# Patient Record
Sex: MALE | Race: BLACK OR AFRICAN AMERICAN
[De-identification: names, ages, dates, MRNs, and addresses within clinical notes are randomized per-mention and may not be internally consistent; named-entity substitution may affect disease eponyms.]

---

## 2019-06-28 ENCOUNTER — HOSPITAL ENCOUNTER (EMERGENCY)
Dept: HOSPITAL 72 - EMR | Age: 34
LOS: 1 days | Discharge: HOME | End: 2019-06-29
Payer: SELF-PAY

## 2019-06-28 VITALS — DIASTOLIC BLOOD PRESSURE: 70 MMHG | SYSTOLIC BLOOD PRESSURE: 122 MMHG

## 2019-06-28 VITALS — WEIGHT: 160 LBS | BODY MASS INDEX: 25.11 KG/M2 | HEIGHT: 67 IN

## 2019-06-28 VITALS — SYSTOLIC BLOOD PRESSURE: 115 MMHG | DIASTOLIC BLOOD PRESSURE: 76 MMHG

## 2019-06-28 VITALS — DIASTOLIC BLOOD PRESSURE: 70 MMHG | SYSTOLIC BLOOD PRESSURE: 120 MMHG

## 2019-06-28 VITALS — SYSTOLIC BLOOD PRESSURE: 118 MMHG | DIASTOLIC BLOOD PRESSURE: 72 MMHG

## 2019-06-28 VITALS — DIASTOLIC BLOOD PRESSURE: 67 MMHG | SYSTOLIC BLOOD PRESSURE: 115 MMHG

## 2019-06-28 DIAGNOSIS — F29: ICD-10-CM

## 2019-06-28 DIAGNOSIS — F15.10: Primary | ICD-10-CM

## 2019-06-28 LAB
ADD MANUAL DIFF: NO
ALBUMIN SERPL-MCNC: 3.7 G/DL (ref 3.4–5)
ALBUMIN/GLOB SERPL: 1.2 {RATIO} (ref 1–2.7)
ALP SERPL-CCNC: 107 U/L (ref 46–116)
ALT SERPL-CCNC: 33 U/L (ref 12–78)
ANION GAP SERPL CALC-SCNC: 8 MMOL/L (ref 5–15)
AST SERPL-CCNC: 30 U/L (ref 15–37)
BASOPHILS NFR BLD AUTO: 0.8 % (ref 0–2)
BILIRUB SERPL-MCNC: 0.5 MG/DL (ref 0.2–1)
BUN SERPL-MCNC: 17 MG/DL (ref 7–18)
CALCIUM SERPL-MCNC: 8.6 MG/DL (ref 8.5–10.1)
CHLORIDE SERPL-SCNC: 104 MMOL/L (ref 98–107)
CK SERPL-CCNC: 282 U/L (ref 26–308)
CO2 SERPL-SCNC: 28 MMOL/L (ref 21–32)
CREAT SERPL-MCNC: 0.8 MG/DL (ref 0.55–1.3)
EOSINOPHIL NFR BLD AUTO: 3.6 % (ref 0–3)
ERYTHROCYTE [DISTWIDTH] IN BLOOD BY AUTOMATED COUNT: 15 % (ref 11.6–14.8)
GLOBULIN SER-MCNC: 3.2 G/DL
HCT VFR BLD CALC: 34.7 % (ref 42–52)
HGB BLD-MCNC: 11.2 G/DL (ref 14.2–18)
LYMPHOCYTES NFR BLD AUTO: 31 % (ref 20–45)
MCV RBC AUTO: 75 FL (ref 80–99)
MONOCYTES NFR BLD AUTO: 6.9 % (ref 1–10)
NEUTROPHILS NFR BLD AUTO: 57.7 % (ref 45–75)
PLATELET # BLD: 400 K/UL (ref 150–450)
POTASSIUM SERPL-SCNC: 3.5 MMOL/L (ref 3.5–5.1)
RBC # BLD AUTO: 4.65 M/UL (ref 4.7–6.1)
SODIUM SERPL-SCNC: 140 MMOL/L (ref 136–145)
WBC # BLD AUTO: 8.4 K/UL (ref 4.8–10.8)

## 2019-06-28 PROCEDURE — 96374 THER/PROPH/DIAG INJ IV PUSH: CPT

## 2019-06-28 PROCEDURE — 85025 COMPLETE CBC W/AUTO DIFF WBC: CPT

## 2019-06-28 PROCEDURE — 99291 CRITICAL CARE FIRST HOUR: CPT

## 2019-06-28 PROCEDURE — 71045 X-RAY EXAM CHEST 1 VIEW: CPT

## 2019-06-28 PROCEDURE — 80307 DRUG TEST PRSMV CHEM ANLYZR: CPT

## 2019-06-28 PROCEDURE — 80329 ANALGESICS NON-OPIOID 1 OR 2: CPT

## 2019-06-28 PROCEDURE — 80053 COMPREHEN METABOLIC PANEL: CPT

## 2019-06-28 PROCEDURE — 96372 THER/PROPH/DIAG INJ SC/IM: CPT

## 2019-06-28 PROCEDURE — 96361 HYDRATE IV INFUSION ADD-ON: CPT

## 2019-06-28 PROCEDURE — 82550 ASSAY OF CK (CPK): CPT

## 2019-06-28 PROCEDURE — 36415 COLL VENOUS BLD VENIPUNCTURE: CPT

## 2019-06-28 NOTE — NUR
ED Nurse Note:



Br[place] by ambulance from [place] [facility] due to OD on 'cracks' and behavioral 
complaints; patient here handcuffed to [facility] with [employer]; Patient appears 
unkempt, awake and confused and yelling. Patient know his name and birthday. 
Per EMS, bystander reports patient has been using 'cracks'.

## 2019-06-28 NOTE — EMERGENCY ROOM REPORT
History of Present Illness


General


Chief Complaint:  Overdose


Source:  Patient, EMS





Present Illness


HPI


Patient presents emergency department today with acute overdose.  Patient 

apparently has been noted to be abusing methamphetamines.  He is brought in by 

police under police escort because he was very aggressive and combative.  He 

was brought by the paramedics.  No further history is available patient is 

refusing to provide much history.  Symptoms noted to be highly severe.  Patient 

appears disheveled and is screaming profanities and is a current danger to 

everyone.  Patient requires sedation.  No other modifying factors.  No other 

associated signs and symptoms.  No other complaints were noted.


Allergies:  


Coded Allergies:  


     No Known Allergies (Unverified , 6/8/16)


     UNABLE TO ASSESS (Unverified , 6/28/19)





Patient History


Past Medical History:  unable to obtain


Past Surgical History:  unable to obtain


Pertinent Family History:  unable to obtain


Social History:  Reports: drug use


Reviewed Nursing Documentation:  PMH: Agreed; PSxH: Agreed





Nursing Documentation-PMH


Past Medical History:  Deferred





Review of Systems


All Other Systems:  limited - Patient not compliant





Physical Exam





Vital Signs








  Date Time  Temp Pulse Resp B/P (MAP) Pulse Ox O2 Delivery O2 Flow Rate FiO2


 


6/28/19 11:10  84 16 115/76 (89) 100 Room Air  








Sp02 EP Interpretation:  reviewed, normal


General Appearance:  alert, moderate distress - Disheveled combative required 

restraint


Head:  atraumatic


Eyes:  bilateral eye normal inspection


ENT:  normal ENT inspection, hearing grossly normal, normal voice


Neck:  normal inspection, full range of motion, supple, no bony tend


Respiratory:  normal inspection, lungs clear, normal breath sounds, no 

respiratory distress, no retraction, no wheezing


Cardiovascular #1:  regular rate, rhythm, no edema


Gastrointestinal:  normal inspection, normal bowel sounds, non tender, soft, no 

guarding, no hernia


Musculoskeletal:  normal inspection, back normal


Neurologic:  normal inspection, alert, responsive, speech normal


Psychiatric:  other - Psychotic combative intoxicated with drugs


Skin:  normal inspection, normal color, no rash





Procedures


Critical Care Time


Critical Care Time


Patient had a critical medical condition which untreated could potentially 

result in life or limb threatening injury.  Total critical care time excluding 

procedures was approximately 45 minutes.





Medical Decision Making


Diagnostic Impression:  


 Primary Impression:  


 Drug abuse


 Additional Impressions:  


 Psychosis


 Behavioral disorder


 Drug overdose


ER Course


Patient presents emergency department today with acute altered mental status 

combative after abusing drugs.  Patient appears disheveled patient required 

police escort.  Symptoms noted to be highly severe.  Patient unable to provide 

history or unwilling.  Patient's presentation is considered a critical patients 

patient is a danger to himself and others.  Patient required four-point 

restraints and then chemical restraints.  Laboratory work-up was obtained 

patient is medically cleared.  We will continue by the patient to see when 

patient wakes up to see if he is appropriate or if he require psychiatric 

evaluation.  Will sign the case out to Dr. Aakash Lowe for final disposition.





Labs








Test


  6/28/19


12:10


 


White Blood Count


  8.4 K/UL


(4.8-10.8)


 


Red Blood Count


  4.65 M/UL


(4.70-6.10)


 


Hemoglobin


  11.2 G/DL


(14.2-18.0)


 


Hematocrit


  34.7 %


(42.0-52.0)


 


Mean Corpuscular Volume 75 FL (80-99) 


 


Mean Corpuscular Hemoglobin


  24.0 PG


(27.0-31.0)


 


Mean Corpuscular Hemoglobin


Concent 32.2 G/DL


(32.0-36.0)


 


Red Cell Distribution Width


  15.0 %


(11.6-14.8)


 


Platelet Count


  400 K/UL


(150-450)


 


Mean Platelet Volume


  6.8 FL


(6.5-10.1)


 


Neutrophils (%) (Auto)


  57.7 %


(45.0-75.0)


 


Lymphocytes (%) (Auto)


  31.0 %


(20.0-45.0)


 


Monocytes (%) (Auto)


  6.9 %


(1.0-10.0)


 


Eosinophils (%) (Auto)


  3.6 %


(0.0-3.0)


 


Basophils (%) (Auto)


  0.8 %


(0.0-2.0)


 


Sodium Level


  140 MMOL/L


(136-145)


 


Potassium Level


  3.5 MMOL/L


(3.5-5.1)


 


Chloride Level


  104 MMOL/L


()


 


Carbon Dioxide Level


  28 MMOL/L


(21-32)


 


Anion Gap


  8 mmol/L


(5-15)


 


Blood Urea Nitrogen


  17 mg/dL


(7-18)


 


Creatinine


  0.8 MG/DL


(0.55-1.30)


 


Estimat Glomerular Filtration


Rate > 60 mL/min


(>60)


 


Glucose Level


  129 MG/DL


()


 


Calcium Level


  8.6 MG/DL


(8.5-10.1)


 


Total Bilirubin


  0.5 MG/DL


(0.2-1.0)


 


Aspartate Amino Transf


(AST/SGOT) 30 U/L (15-37) 


 


 


Alanine Aminotransferase


(ALT/SGPT) 33 U/L (12-78) 


 


 


Alkaline Phosphatase


  107 U/L


()


 


Total Creatine Kinase


  282 U/L


()


 


Total Protein


  6.9 G/DL


(6.4-8.2)


 


Albumin


  3.7 G/DL


(3.4-5.0)


 


Globulin 3.2 g/dL 


 


Albumin/Globulin Ratio 1.2 (1.0-2.7) 


 


Salicylates Level


  0.9 ug/mL


(2.8-20)


 


Urine Opiates Screen


  Negative


(NEGATIVE)


 


Acetaminophen Level


  < 2 MCG/ML


(10-30)


 


Urine Barbiturates Screen


  Negative


(NEGATIVE)


 


Phencyclidine (PCP) Screen


  Negative


(NEGATIVE)


 


Urine Amphetamines Screen


  Positive


(NEGATIVE)


 


Urine Benzodiazepines Screen


  Negative


(NEGATIVE)


 


Urine Cocaine Screen


  Negative


(NEGATIVE)


 


Urine Marijuana (THC) Screen


  Negative


(NEGATIVE)


 


Serum Alcohol < 3 mg/dL 











Last Vital Signs








  Date Time  Temp Pulse Resp B/P (MAP) Pulse Ox O2 Delivery O2 Flow Rate FiO2


 


6/28/19 11:10  84 16 115/76 (89) 100 Room Air  








Status:  improved


Referrals:  


NOT CHOSEN [facility]/MD,REFERRING (PCP)











Shan Sosa MD Jun 28, 2019 13:15

## 2019-06-28 NOTE — DIAGNOSTIC IMAGING REPORT
Indication: Chest pain, cough

 

Technique: XRAY Chest 1v

 

Comparison: None

 

Findings: Heart size and mediastinal contours are within normal limits for AP

technique. There is no focal airspace consolidation, pneumothorax or pleural

effusion. Osseous structures demonstrate no acute abnormality. Two metallic densities

project over the chest, likely external to the patient.

 

Impression: No radiographic evidence of acute cardiopulmonary disease.

 

Two metallic densities project over the lateral chest, likely external to the

patient. Correlate with physical exam.

## 2019-06-28 NOTE — NUR
ER Nurse Note:



Pt remains asleep, no signs of distress. Pt arousable but goes back to sleep. 
All orders completed per ERMD orders. All safety measures met; will continue to 
montior.

## 2019-06-28 NOTE — NUR
ER Nurse Note:



Pt asleep, calm, VSS, no signs of distress. Urinal provided. Extra blanktets at 
bedside. All safety measures met; will continue to montior.

## 2019-06-28 NOTE — NUR
ED Nurse Note:

pt lying in bed without facial grimacing or moaning noted.  will wait for 
further order.

## 2019-06-29 VITALS — SYSTOLIC BLOOD PRESSURE: 122 MMHG | DIASTOLIC BLOOD PRESSURE: 70 MMHG

## 2019-06-29 VITALS — DIASTOLIC BLOOD PRESSURE: 72 MMHG | SYSTOLIC BLOOD PRESSURE: 130 MMHG

## 2019-06-29 VITALS — SYSTOLIC BLOOD PRESSURE: 126 MMHG | DIASTOLIC BLOOD PRESSURE: 72 MMHG

## 2019-06-29 NOTE — NUR
ER Nurse Note:



Pt seen, treated, medically cleared for discharge by ERMD. Discharge 
instuctions given with repeat verbalization by pt. All orders completed per 
ERMD orders. Pt a&ox4, VSS, no signs of distress. Pt denies pain. ID band 
removed. IV removed, site clean and bandaged. Pt ambulaitory with steady gait, 
left with all belongings, left with own transportation. Provided pt with extra 
resources but pt refused. Pt had approrate clothing for weather. Pt refused to 
share discharge location but told ERMD and RN that he has a location to go to. 
Pt refused to wash up before leaving. Food and drinks provided, bus fare 
provided.

## 2019-06-29 NOTE — NUR
ER Nurse Note:



Pt calm, cooperative, asleep. Chest rise and fall noted; O2 >96%. VSS, no signs 
of distress. All orders completed per ERMD orders. All safety measures met; 
will continue to montior.

## 2019-06-29 NOTE — NUR
ER Nurse Note:



Pt asleep, no signs of distress. Pt does not show s/s of pain, difficulty 
breathing, n/v. Pt normotensive. All orders completed per ERMD orders, all 
safety measures met; will continue to montior.

## 2019-06-29 NOTE — NUR
ER Nurse Note:



Pt awake, ambulatory with steady gait. Pt has a slurred speech at baseline. Pt 
is calm, cooperative, states he wants to go home. Provided pt with juice and 
food.